# Patient Record
Sex: MALE | Race: WHITE | Employment: FULL TIME | URBAN - METROPOLITAN AREA
[De-identification: names, ages, dates, MRNs, and addresses within clinical notes are randomized per-mention and may not be internally consistent; named-entity substitution may affect disease eponyms.]

---

## 2017-10-27 ENCOUNTER — HOSPITAL ENCOUNTER (EMERGENCY)
Facility: CLINIC | Age: 52
Discharge: HOME | End: 2017-10-27
Attending: NURSE PRACTITIONER | Admitting: NURSE PRACTITIONER
Payer: COMMERCIAL

## 2017-10-27 VITALS
OXYGEN SATURATION: 97 % | HEART RATE: 81 BPM | SYSTOLIC BLOOD PRESSURE: 155 MMHG | RESPIRATION RATE: 14 BRPM | DIASTOLIC BLOOD PRESSURE: 97 MMHG | TEMPERATURE: 98.5 F

## 2017-10-27 DIAGNOSIS — R33.9 URINARY RETENTION WITH INCOMPLETE BLADDER EMPTYING: ICD-10-CM

## 2017-10-27 DIAGNOSIS — T83.018A URINARY CATHETER DYSFUNCTION, INITIAL ENCOUNTER (H): ICD-10-CM

## 2017-10-27 LAB
ALBUMIN UR-MCNC: 100 MG/DL
ANION GAP SERPL CALCULATED.3IONS-SCNC: 3 MMOL/L (ref 3–14)
APPEARANCE UR: ABNORMAL
BACTERIA #/AREA URNS HPF: ABNORMAL /HPF
BILIRUB UR QL STRIP: NEGATIVE
BUN SERPL-MCNC: 14 MG/DL (ref 7–30)
CALCIUM SERPL-MCNC: 8.5 MG/DL (ref 8.5–10.1)
CHLORIDE SERPL-SCNC: 105 MMOL/L (ref 94–109)
CO2 SERPL-SCNC: 31 MMOL/L (ref 20–32)
COLOR UR AUTO: ABNORMAL
CREAT SERPL-MCNC: 1.41 MG/DL (ref 0.66–1.25)
GFR SERPL CREATININE-BSD FRML MDRD: 52 ML/MIN/1.7M2
GLUCOSE SERPL-MCNC: 114 MG/DL (ref 70–99)
GLUCOSE UR STRIP-MCNC: NEGATIVE MG/DL
HGB UR QL STRIP: ABNORMAL
KETONES UR STRIP-MCNC: NEGATIVE MG/DL
LEUKOCYTE ESTERASE UR QL STRIP: ABNORMAL
MUCOUS THREADS #/AREA URNS LPF: PRESENT /LPF
NITRATE UR QL: NEGATIVE
PH UR STRIP: 6 PH (ref 5–7)
POTASSIUM SERPL-SCNC: 3.8 MMOL/L (ref 3.4–5.3)
RBC #/AREA URNS AUTO: >182 /HPF (ref 0–2)
SODIUM SERPL-SCNC: 139 MMOL/L (ref 133–144)
SOURCE: ABNORMAL
SP GR UR STRIP: 1.01 (ref 1–1.03)
UROBILINOGEN UR STRIP-MCNC: 0 MG/DL (ref 0–2)
WBC #/AREA URNS AUTO: 49 /HPF (ref 0–2)
WBC CLUMPS #/AREA URNS HPF: PRESENT /HPF

## 2017-10-27 PROCEDURE — 51702 INSERT TEMP BLADDER CATH: CPT

## 2017-10-27 PROCEDURE — 25000128 H RX IP 250 OP 636: Performed by: EMERGENCY MEDICINE

## 2017-10-27 PROCEDURE — 80048 BASIC METABOLIC PNL TOTAL CA: CPT | Performed by: EMERGENCY MEDICINE

## 2017-10-27 PROCEDURE — 51700 IRRIGATION OF BLADDER: CPT

## 2017-10-27 PROCEDURE — 51798 US URINE CAPACITY MEASURE: CPT

## 2017-10-27 PROCEDURE — 99285 EMERGENCY DEPT VISIT HI MDM: CPT | Mod: 25

## 2017-10-27 PROCEDURE — 96374 THER/PROPH/DIAG INJ IV PUSH: CPT

## 2017-10-27 PROCEDURE — 87086 URINE CULTURE/COLONY COUNT: CPT | Performed by: EMERGENCY MEDICINE

## 2017-10-27 PROCEDURE — 81001 URINALYSIS AUTO W/SCOPE: CPT | Performed by: EMERGENCY MEDICINE

## 2017-10-27 RX ORDER — HYDROMORPHONE HYDROCHLORIDE 1 MG/ML
0.5 INJECTION, SOLUTION INTRAMUSCULAR; INTRAVENOUS; SUBCUTANEOUS ONCE
Status: COMPLETED | OUTPATIENT
Start: 2017-10-27 | End: 2017-10-27

## 2017-10-27 RX ORDER — HYDROMORPHONE HYDROCHLORIDE 1 MG/ML
0.5 INJECTION, SOLUTION INTRAMUSCULAR; INTRAVENOUS; SUBCUTANEOUS
Status: DISCONTINUED | OUTPATIENT
Start: 2017-10-27 | End: 2017-10-28 | Stop reason: HOSPADM

## 2017-10-27 RX ADMIN — HYDROMORPHONE HYDROCHLORIDE 0.5 MG: 1 INJECTION, SOLUTION INTRAMUSCULAR; INTRAVENOUS; SUBCUTANEOUS at 18:29

## 2017-10-27 NOTE — ED PROVIDER NOTES
History     Chief Complaint:  Catheter Problem    HPI   Franklin Nicole is a 52 year old male who presents to the emergency department today for evaluation of catheter dysfunction.  1 hour prior to arrival patient's Nogueira catheter which was placed after his bladder tumor resection yesterday stopped working. His symptoms had increasing pain in the suprapubic area and a feeling of distention.  Denies fever nausea vomiting diarrhea.      Allergies:  No Known Drug Allergies    Medications:    Percocet     Past Medical History:    Medical history reviewed. No pertinent medical history.    Past Surgical History:    Head and neck Surgery  Bladder Surgery    Family History:    Family history reviewed. No pertinent family history.     Social History:  Smoking Status: Never Smoker  Alcohol Use: Positive  Marital Status:  Single [1]     Review of Systems   ROS: 10 point ROS neg other than the symptoms noted above in the HPI.      Physical Exam     Patient Vitals for the past 24 hrs:   BP Temp Temp src Pulse Resp SpO2   10/27/17 1721 - 98.5  F (36.9  C) Oral - - -   10/27/17 1717 (!) 155/97 - - 81 14 97 %     Physical Exam    HEENT:  mmm  Neck: supple  CV: ppi, regular   Resp: speaking in full sentences with any resp distress  Abd: + ttp suprapubic space, no peritonitis  Skin: warm dry well perfused  Neuro: Alert, no gross motor or sensory deficits,  gait stable        Emergency Department Course     UA with Microscopic (Final result)    Abnormal Component (Lab Inquiry)       Collection Time Result Time COLOR APPEARANCE URINE GLC URINEBILIN URINEKETON     10/27/17 19:12:00 10/27/17 19:46:10 Red Slightly Cloudy Negative Negative Negative          Collection Time Result Time Specific Gravity Urine URINE BLOO pH Urine Protein Albumin Urine Urobilinogen mg/dL     10/27/17 19:12:00 10/27/17 19:46:10 1.011 Moderate (A) 6.0 100 (A) 0.0          Collection Time Result Time NITRITE Leukocyte Esterase Urine Source WBC/HPF RBC/HPF      10/27/17 19:12:00 10/27/17 19:46:10 Negative Moderate (A) Catheterized Urine 49 (H) >182 (H)          Collection Time Result Time WBC Clumps BACTERIA Mucous Urine     10/27/17 19:12:00 10/27/17 19:46:10 Present (A) Few (A) Present (A)                      Urine Culture (Preliminary result) Component (Lab Inquiry)       Collection Time Result Time Specimen Description Special Requests Culture Micro     10/27/17 19:12:00 10/28/17 01:12:16 Catheterized Urine Specimen received in preservative PENDING                      Basic metabolic panel (Final result)    Abnormal Component (Lab Inquiry)       Collection Time Result Time NA POTASSIUM Chloride Carbon Dioxide ANION GAP     10/27/17 18:24:00 10/27/17 18:55:27 139 3.8 105 31 3          Collection Time Result Time GLUCOSE BUN CREATININE GFR Estimate GFR Estimate If Black     10/27/17 18:24:00 10/27/17 18:55:27 114 (H) 14 1.41 (H) 52 (L)  Non  GFR Calc 63   GFR Calc          Collection Time Result Time Calcium     10/27/17 18:24:00 10/27/17 18:55:27 8.5                Impression & Plan      Medical Decision Making:  Franklin Nicole is a 52 year old male who presents to the emergency department today for evaluation of catheter dysfunction postop day 1 bladder tumor resection and left ureteral stent placement.  Likely obstructed secondary to a clot Cruz Aguilar physician's assistant saw the patient initially spoke with his urologist on-call recommending three-way catheter placement if urine clears and symptoms resolve discharge home with a catheter in place if continues to have pain unable to get urine to clear admit for ongoing continuous bladder irrigation symptom control    Patient earlier in the emergency room urine showing pyuria and hematuria. To be expected after his recent procedure. Urine culture will be sent he was not discharged on antibiotics from the hospital will not start him unless he were to develop constitutional symptoms  culture can be followed. 3 L of irrigation was done here in the emergency per with clearing of his hematuria. He continued to have bladder spasm and discomfort with the Nogueira catheter in place twice when he tried to stand up he was leaking around the catheter. [facility]ist again regarding this finding and options include keeping the catheter in treating spasm with Ditropan or taking catheter trial of voiding. The recommendation was to keep the catheter in if he would be okay with that. Patient preferred to have the catheter at this point and do a trial of voiding. His catheter was then removed he understands if he has signs of abdominal pain poor urinary output this likely indicates retention we'll need to return for Nogueira catheter placement.  We encouraged him to keep himself hydrated to help with dilute the microscopic hematuria going on. He verbalizes understanding and agreement with this plan.      Diagnosis:      ICD-10-CM    1. Urinary retention with incomplete bladder emptying R33.9 Urine Culture   2. Urinary catheter dysfunction, initial encounter (H) T83.018A      Disposition:   Home    Discharge Medications:  \  Discharge Medication List as of 10/27/2017  9:46 PM            [facility] EMERGENCY DEPARTMENT       Jadiel Phillips MD  10/28/17 0202

## 2017-10-27 NOTE — ED AVS SNAPSHOT
[facility] Emergency Department    [address]    [facility] [address]    Phone:  [phone]    Fax:  [phone]                                       Franklin Nicole   MRN: [id number]    Department:  [facility] Emergency Department   Date of Visit:  10/27/2017           Patient Information     Date Of Birth          1965        Your diagnoses for this visit were:     Urinary retention with incomplete bladder emptying     Urinary catheter dysfunction, initial encounter (H)        You were seen by Cruz Aguilar APRN CNP and Jadiel Phillips MD.        Discharge Instructions       Call [facility] urology on Monday to let them know how you're doing and went to get a new catheter or to take the catheter out and try voiding        Nogueira Catheter Care    A Nogueira catheter is a rubber tube that is placed through the urethra (opening where urine comes out) and into the bladder. This helps drain urine from the bladder. There is a small balloon on the end of the tube that is inflated after insertion. This keeps the catheter from sliding out of the bladder.  A Nogueira catheter is used to treat urinary retention (unable to pass urine). It is also used when there is incontinence (loss of bladder control).  Home care    Finish taking any prescribed antibiotic even if you are feeling better before then.    It is important to keep bacteria from getting into the collection bag. Do not disconnect the catheter from the collection bag.    Use a leg band to secure the drainage tube, so it does not pull on the catheter. Drain the collection bag when it becomes full using the drain spout at the bottom of the bag.    Do not try to pull or remove your catheter. This will injure your urethra. It must be removed by your healthcare provider or nurse.  Follow-up care  Follow up with your healthcare provider as advised for repeat urine testing and catheter removal or  replacement.  When to seek medical advice  Call your healthcare provider right away if any of these occur:    Fever of 100.4 F (38 C) or higher, or as directed by your healthcare provider    Bladder pain or fullness    Abdominal swelling, nausea or vomiting, or back pain    Blood or urine leakage around the catheter    Bloody urine coming from the catheter (if a new symptom)    Catheter falls out    Catheter stops draining for 6 hours    Weakness, dizziness, or fainting  Date Last Reviewed: 10/1/2016    [id number] [employer]. [address] [place] [address]. All rights reserved. This information is not intended as a substitute for professional medical care. Always follow your healthcare professional's instructions.          24 Hour Appointment Hotline       To make an appointment at any [facility], call [phone] ([phone]). If you don't have a family doctor or clinic, we will help you find one. [facility] clinics are conveniently located to serve the needs of you and your family.             Review of your medicines      Our records show that you are taking the medicines listed below. If these are incorrect, please call your family doctor or clinic.        Dose / Directions Last dose taken    PERCOCET PO        Take  by mouth.   Refills:  0                Procedures and tests performed during your visit     Basic metabolic panel    UA with Microscopic      Orders Needing Specimen Collection     None      Pending Results     No orders found from 10/25/2017 to 10/28/2017.            Pending Culture Results     No orders found from 10/25/2017 to 10/28/2017.            Pending Results Instructions     If you had any lab results that were not finalized at the time of your Discharge, you can call the ED Lab Result RN at [phone]. You will be contacted by this team for any positive Lab results or changes in treatment. The nurses are available 7 days a week from 10A to 6:30P.  You  can leave a message 24 hours per day and they will return your call.        Test Results From Your Hospital Stay        10/27/2017  7:46 PM      Component Results     Component Value Ref Range & Units Status    Color Urine Red  Final    Appearance Urine Slightly Cloudy  Final    Glucose Urine Negative NEG^Negative mg/dL Final    Bilirubin Urine Negative NEG^Negative Final    Ketones Urine Negative NEG^Negative mg/dL Final    Specific Gravity Urine 1.011 1.003 - 1.035 Final    Blood Urine Moderate (A) NEG^Negative Final    pH Urine 6.0 5.0 - 7.0 pH Final    Protein Albumin Urine 100 (A) NEG^Negative mg/dL Final    Urobilinogen mg/dL 0.0 0.0 - 2.0 mg/dL Final    Nitrite Urine Negative NEG^Negative Final    Leukocyte Esterase Urine Moderate (A) NEG^Negative Final    Source Catheterized Urine  Final    WBC Urine 49 (H) 0 - 2 /HPF Final    RBC Urine >182 (H) 0 - 2 /HPF Final    WBC Clumps Present (A) NEG^Negative /HPF Final    Bacteria Urine Few (A) NEG^Negative /HPF Final    Mucous Urine Present (A) NEG^Negative /LPF Final         10/27/2017  6:55 PM      Component Results     Component Value Ref Range & Units Status    Sodium 139 133 - 144 mmol/L Final    Potassium 3.8 3.4 - 5.3 mmol/L Final    Chloride 105 94 - 109 mmol/L Final    Carbon Dioxide 31 20 - 32 mmol/L Final    Anion Gap 3 3 - 14 mmol/L Final    Glucose 114 (H) 70 - 99 mg/dL Final    Urea Nitrogen 14 7 - 30 mg/dL Final    Creatinine 1.41 (H) 0.66 - 1.25 mg/dL Final    GFR Estimate 52 (L) >60 mL/min/1.7m2 Final    Non  GFR Calc    GFR Estimate If Black 63 >60 mL/min/1.7m2 Final    African American GFR Calc    Calcium 8.5 8.5 - 10.1 mg/dL Final                Clinical Quality Measure: Blood Pressure Screening     Your blood pressure was checked while you were in the emergency department today. The last reading we obtained was  BP: (!) 155/97 . Please read the guidelines below about what these numbers mean and what you should do about them.  If  "your systolic blood pressure (the top number) is less than 120 and your diastolic blood pressure (the bottom number) is less than 80, then your blood pressure is normal. There is nothing more that you need to do about it.  If your systolic blood pressure (the top number) is 120-139 or your diastolic blood pressure (the bottom number) is 80-89, your blood pressure may be higher than it should be. You should have your blood pressure rechecked within a year by a primary care provider.  If your systolic blood pressure (the top number) is 140 or greater or your diastolic blood pressure (the bottom number) is 90 or greater, you may have high blood pressure. High blood pressure is treatable, but if left untreated over time it can put you at risk for heart attack, stroke, or kidney failure. You should have your blood pressure rechecked by a primary care provider within the next 4 weeks.  If your provider in the emergency department today gave you specific instructions to follow-up with your doctor or provider even sooner than that, you should follow that instruction and not wait for up to 4 weeks for your follow-up visit.        Thank you for choosing [facility]       Thank you for choosing [facility] for your care. Our goal is always to provide you with excellent care. Hearing back from our patients is one way we can continue to improve our services. Please take a few minutes to complete the written survey that you may receive in the mail after you visit with us. Thank you!        [employer] Information     [employer] lets you send messages to your doctor, view your test results, renew your prescriptions, schedule appointments and more. To sign up, go to [email or web address] . Click on \"Log in\" on the left side of the screen, which will take you to the Welcome page. Then click on \"Sign up Now\" on the right side of the page.     You will be asked to enter the access code listed below, as well as some personal information. Please " follow the directions to create your username and password.     Your access code is: [email or web address]  Expires: 2018  9:38 PM     Your access code will  in 90 days. If you need help or a new code, please call your [place] clinic or [phone].        Care EveryWhere ID     This is your Care EveryWhere ID. This could be used by other organizations to access your [facility] medical records  [id number]        Equal Access to Services     [facility] KAYDEN : Hadii aad ku hadasho Soomaali, waaxda luqadaha, qaybta kaalmada adeegyasade, valentina bradley. So [facility] [phone].    ATENCIÓN: Si habla español, tiene a schumacher disposición servicios gratuitos de asistencia lingüística. Llame al [phone].    We comply with applicable federal civil rights laws and [place] laws. We do not discriminate on the basis of race, color, national origin, age, disability, sex, sexual orientation, or gender identity.            After Visit Summary       This is your record. Keep this with you and show to your community pharmacist(s) and doctor(s) at your next visit.

## 2017-10-27 NOTE — ED NOTES
Bladder surgery yesterday. Catheter worked fine until about 45min ago. Nothing is coming out and he is in a lot of pain.

## 2017-10-27 NOTE — ED AVS SNAPSHOT
[facility] Emergency Department    [address]    [facility] [address]    Phone:  [phone]    Fax:  [phone]                                       Franklin Nicole   MRN: [id number]    Department:  [facility] Emergency Department   Date of Visit:  10/27/2017           After Visit Summary Signature Page     I have received my discharge instructions, and my questions have been answered. I have discussed any challenges I see with this plan with the nurse or doctor.    ..........................................................................................................................................  Patient/Patient Representative Signature      ..........................................................................................................................................  Patient Representative Print Name and Relationship to Patient    ..................................................               ................................................  Date                                            Time    ..........................................................................................................................................  Reviewed by Signature/Title    ...................................................              ..............................................  Date                                                            Time

## 2017-10-28 NOTE — DISCHARGE INSTRUCTIONS
Call [facility] urology on Monday to let them know how you're doing and went to get a new catheter or to take the catheter out and try voiding        Nogueira Catheter Care    A Nogueira catheter is a rubber tube that is placed through the urethra (opening where urine comes out) and into the bladder. This helps drain urine from the bladder. There is a small balloon on the end of the tube that is inflated after insertion. This keeps the catheter from sliding out of the bladder.  A Nogueira catheter is used to treat urinary retention (unable to pass urine). It is also used when there is incontinence (loss of bladder control).  Home care    Finish taking any prescribed antibiotic even if you are feeling better before then.    It is important to keep bacteria from getting into the collection bag. Do not disconnect the catheter from the collection bag.    Use a leg band to secure the drainage tube, so it does not pull on the catheter. Drain the collection bag when it becomes full using the drain spout at the bottom of the bag.    Do not try to pull or remove your catheter. This will injure your urethra. It must be removed by your healthcare provider or nurse.  Follow-up care  Follow up with your healthcare provider as advised for repeat urine testing and catheter removal or replacement.  When to seek medical advice  Call your healthcare provider right away if any of these occur:    Fever of 100.4 F (38 C) or higher, or as directed by your healthcare provider    Bladder pain or fullness    Abdominal swelling, nausea or vomiting, or back pain    Blood or urine leakage around the catheter    Bloody urine coming from the catheter (if a new symptom)    Catheter falls out    Catheter stops draining for 6 hours    Weakness, dizziness, or fainting  Date Last Reviewed: 10/1/2016    4094-4355 [employer]. [address] [place] [address]. All rights reserved. This information is not intended as a substitute for  professional medical care. Always follow your healthcare professional's instructions.

## 2017-10-29 LAB
BACTERIA SPEC CULT: NO GROWTH
Lab: NORMAL
SPECIMEN SOURCE: NORMAL

## 2020-10-26 ENCOUNTER — APPOINTMENT (OUTPATIENT)
Dept: GENERAL RADIOLOGY | Facility: CLINIC | Age: 55
End: 2020-10-26
Attending: EMERGENCY MEDICINE
Payer: COMMERCIAL

## 2020-10-26 ENCOUNTER — HOSPITAL ENCOUNTER (EMERGENCY)
Facility: CLINIC | Age: 55
Discharge: HOME | End: 2020-10-26
Attending: EMERGENCY MEDICINE | Admitting: EMERGENCY MEDICINE
Payer: COMMERCIAL

## 2020-10-26 VITALS
RESPIRATION RATE: 18 BRPM | DIASTOLIC BLOOD PRESSURE: 78 MMHG | OXYGEN SATURATION: 97 % | HEART RATE: 79 BPM | SYSTOLIC BLOOD PRESSURE: 145 MMHG | TEMPERATURE: 98.2 F

## 2020-10-26 DIAGNOSIS — R07.89 ATYPICAL CHEST PAIN: ICD-10-CM

## 2020-10-26 DIAGNOSIS — R06.02 SOB (SHORTNESS OF BREATH): ICD-10-CM

## 2020-10-26 LAB
ANION GAP SERPL CALCULATED.3IONS-SCNC: 8 MMOL/L (ref 3–14)
BUN SERPL-MCNC: 14 MG/DL (ref 7–30)
CALCIUM SERPL-MCNC: 8.2 MG/DL (ref 8.5–10.1)
CHLORIDE SERPL-SCNC: 108 MMOL/L (ref 94–109)
CO2 SERPL-SCNC: 25 MMOL/L (ref 20–32)
CREAT SERPL-MCNC: 1.32 MG/DL (ref 0.66–1.25)
D DIMER PPP FEU-MCNC: 0.3 UG/ML FEU (ref 0–0.5)
ERYTHROCYTE [DISTWIDTH] IN BLOOD BY AUTOMATED COUNT: 12.6 % (ref 10–15)
GFR SERPL CREATININE-BSD FRML MDRD: 59 ML/MIN/{1.73_M2}
GLUCOSE SERPL-MCNC: 121 MG/DL (ref 70–99)
HCT VFR BLD AUTO: 44.2 % (ref 40–53)
HGB BLD-MCNC: 14.6 G/DL (ref 13.3–17.7)
MCH RBC QN AUTO: 29.7 PG (ref 26.5–33)
MCHC RBC AUTO-ENTMCNC: 33 G/DL (ref 31.5–36.5)
MCV RBC AUTO: 90 FL (ref 78–100)
PLATELET # BLD AUTO: 216 10E9/L (ref 150–450)
POTASSIUM SERPL-SCNC: 3.6 MMOL/L (ref 3.4–5.3)
RBC # BLD AUTO: 4.91 10E12/L (ref 4.4–5.9)
SODIUM SERPL-SCNC: 141 MMOL/L (ref 133–144)
TROPONIN I SERPL-MCNC: <0.015 UG/L (ref 0–0.04)
WBC # BLD AUTO: 8.3 10E9/L (ref 4–11)

## 2020-10-26 PROCEDURE — 85379 FIBRIN DEGRADATION QUANT: CPT | Performed by: EMERGENCY MEDICINE

## 2020-10-26 PROCEDURE — 99285 EMERGENCY DEPT VISIT HI MDM: CPT | Mod: 25

## 2020-10-26 PROCEDURE — 93005 ELECTROCARDIOGRAM TRACING: CPT

## 2020-10-26 PROCEDURE — 71045 X-RAY EXAM CHEST 1 VIEW: CPT

## 2020-10-26 PROCEDURE — 80048 BASIC METABOLIC PNL TOTAL CA: CPT | Performed by: EMERGENCY MEDICINE

## 2020-10-26 PROCEDURE — 85027 COMPLETE CBC AUTOMATED: CPT | Performed by: EMERGENCY MEDICINE

## 2020-10-26 PROCEDURE — 84484 ASSAY OF TROPONIN QUANT: CPT | Performed by: EMERGENCY MEDICINE

## 2020-10-26 ASSESSMENT — ENCOUNTER SYMPTOMS
FEVER: 0
BLOOD IN STOOL: 0
COUGH: 0
SHORTNESS OF BREATH: 1

## 2020-10-26 NOTE — ED AVS SNAPSHOT
[facility] Emergency Dept  [address]  [facility] [address]  Phone: [phone]  Fax: [phone]                                    Franklin Nicole   MRN: [id number]    Department: [facility] Emergency Dept   Date of Visit: 10/26/2020           After Visit Summary Signature Page    I have received my discharge instructions, and my questions have been answered. I have discussed any challenges I see with this plan with the nurse or doctor.    ..........................................................................................................................................  Patient/Patient Representative Signature      ..........................................................................................................................................  Patient Representative Print Name and Relationship to Patient    ..................................................               ................................................  Date                                   Time    ..........................................................................................................................................  Reviewed by Signature/Title    ...................................................              ..............................................  Date                                               Time          22EPIC Rev 08/18

## 2020-10-27 LAB — INTERPRETATION ECG - MUSE: NORMAL

## 2020-10-27 NOTE — ED PROVIDER NOTES
"History     Chief Complaint:  Chest Pain and Shortness of Breath       The history is provided by the patient.     Franklin Nicole is a 55 year old male with a history of bladder cancer who presents for evaluation of chest pain and shortness of breath. He state that he woke up this morning and had chest pain that lasted about thirty minutes in duration. He generally felt \"off\" after this episode this morning and has noticed increased shortness of breath recently. He notes that activities including one minute of sports, walking up the stairs, or walking to the couch have exacerbated his shortness of breath more than usual recently.     Here, he endorses shortness of breath but denies any current chest pain. He denies cough, fever, hematochezia, or melena. He has not had previous similar symptoms and denies any personal heart history. He does believe he has had a previous stress ECHO about 10 years prior.  He notes both his father and brother have heart histories significant for heart attacks.       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  Smoking Status: Never  Smokeless Tobacco: Never  Alcohol Use: No  Drug Use: Never   Marital Status: Single  PCP: Alex Caballero        Review of Systems   Constitutional: Negative for fever.   Respiratory: Positive for shortness of breath. Negative for cough.    Cardiovascular: Positive for chest pain.   Gastrointestinal: Negative for blood in stool.   All other systems reviewed and are negative.        Physical Exam     Patient Vitals for the past 24 hrs:   BP Temp Pulse Resp SpO2   10/26/20 2150 136/88 -- 79 -- 97 %   10/26/20 2119 (!) 140/87 98.2 "  F (36.8  C) 96 14 95 %          Physical Exam    Constitutional:  Oriented to person, place, and time.   HENT:   Head:    Normocephalic.   Mouth/Throat:   Oropharynx is clear and moist.   Eyes:    EOM are normal. Pupils are equal, round, and reactive to light.   Neck:    Neck supple.   Cardiovascular:  Normal rate, regular rhythm and normal heart sounds.      Exam reveals no gallop and no friction rub.       No murmur heard.  Pulmonary/Chest:  Effort normal and breath sounds normal.      No respiratory distress. No wheezes. No rales.      No reproducible chest wall pain.  Abdominal:   Soft. No distension. No tenderness. No rebound and no guarding.   Musculoskeletal:  Normal range of motion. 2+ distal equal pulses.  No leg calf tenderness, swelling or edema.   Neurological:   Alert and oriented to person, place, and time.           Moves all 4 extremities spontaneously    Skin:    No rash noted. No pallor.      Emergency Department Course     ECG:  NSR 79 no acute ischemic changes    Imaging:  Radiology results were communicated with the patient who voiced understanding of the findings.    XR Chest Port 1 view:  IMPRESSION: Negative chest.  Reading per radiology.    Laboratory:  Laboratory findings were communicated with the patient who voiced understanding of the findings.    CBC: WBC 8.3, HGB 14.6,   BMP: Glucose 121 (H), Creatinine 1.32 (H), GFR 59 (L), Calcium 8.2 (L), o/w WNL   Troponin (Collected 2149): <0.015   D-Dimer: 0.3      Symptomatic COVID-19 (Coronavirus) PCR by Nasopharyngeal Swab: Pending     Emergency Department Course:    Nursing notes and vitals reviewed.    EKG obtained as noted above.     2216 I performed an exam of the patient as documented above.     2149 IV was inserted and blood was drawn for laboratory testing, results above.    2307 Findings and plan explained to the Patient. Patient discharged home with instructions regarding supportive care, medications, and reasons to return.  The importance of close follow-up was reviewed. The patient was prescribed as below.     Impression & Plan     Medical Decision Making:  Franklin Nicole is a 55 year old male who presents with chest pain. The work up in the Emergency Room is thus far negative. The differential diagnosis of chest pain is broad and includes life threatening etiologies such as acute coronary syndrome, myocardial infarction, pulmonary embolism, acute aortic dissection, amongst others. Other causes may include pneumonia, pneumothorax, chest wall source, pericarditis, pleurisy, esophageal spasm, etc. No serious etiology for the chest pain were detected today during this visit. Workup included XR and labs which were unremarkable. The patient is PERC negative, therefore I would doubt PE.  He appropriate for outpatient follow-up and will schedule an outpatient stress test. Close follow up with primary care is indicated should the pain continue, as further work up may be performed; this was made clear to the patient, who understands.       Diagnosis:     ICD-10-CM    1. Atypical chest pain  R07.89 Echo Stress Echocardiogram   2. SOB (shortness of breath)  R06.02         Disposition:  Discharged to home.    Scribe Disclosure:  I, Sherry Herrera, am serving as a scribe at 10:12 PM on 10/26/2020 to document services personally performed by Vicente Oro MD based on my observations and the provider's statements to me.      Vicente Oro MD  10/27/20 0358

## 2020-10-27 NOTE — ED TRIAGE NOTES
Pt presents to ED due to chest pain and exertional shortness of breath.  States he gets very short of breath when walking.  Woke up with severe chest pain about 5am, went away, and 1 hour ago has chest discomfort.